# Patient Record
Sex: MALE | Race: WHITE | NOT HISPANIC OR LATINO | URBAN - METROPOLITAN AREA
[De-identification: names, ages, dates, MRNs, and addresses within clinical notes are randomized per-mention and may not be internally consistent; named-entity substitution may affect disease eponyms.]

---

## 2018-02-07 ENCOUNTER — OFFICE VISIT CONVERTED (OUTPATIENT)
Dept: PULMONOLOGY | Facility: CLINIC | Age: 83
End: 2018-02-07
Attending: INTERNAL MEDICINE

## 2021-05-28 VITALS
DIASTOLIC BLOOD PRESSURE: 80 MMHG | OXYGEN SATURATION: 87 % | BODY MASS INDEX: 31.07 KG/M2 | WEIGHT: 217 LBS | HEIGHT: 70 IN | TEMPERATURE: 97.6 F | HEART RATE: 125 BPM | SYSTOLIC BLOOD PRESSURE: 114 MMHG | RESPIRATION RATE: 20 BRPM

## 2021-05-28 NOTE — PROGRESS NOTES
Patient: STU VAZ     Acct: [id number]     Report: #[id number]  UNIT #: [id number]     : 1931    Encounter Date:2018  PRIMARY CARE: ESME AGOSTO  ***Signed***  --------------------------------------------------------------------------------------------------------------------  Chief Complaint      Encounter Date      2018            Referring Provider      Jose F Wilde            Patient Complaint      Patient is complaining of      pt here for 14 day ASHWIN            TRANSITION OF CARE 14D      Transition of Care      ASHWIN 14 Day Followup      Stu presents to office for follow up post discharge from inpatient status     within 14 calendar days. Patient was contacted within 2 business days via phone     conversation. Documentation of that phone call is present in the patient's     electronic chart. He  was admitted to inpatient facility on 18 and was     discharged on 1-3-31 due to:Acute on chronic hypoxic respiratory failure     secondary to acute decompensated diastolic heart failure      Loculated right Pleural effusion chronic      Watery diarrhea      Pulmonary edema      JOSE on Chronic kidney disease stage III improved      COPD with acute exacerbation      Atrial fibrillation on anticoagulation      Deconditioning      Right shoulder weakness with abduction      History of hypertension      History of left lower lobe lobectomy      History of peripheral vascular disease      History of tobacco abuse with cigarettes currently in remission  .            Admitting MD: jose f wilde            His  discharge summary has been reviewed and placed in the patient's electronic     chart.            Problem List      Problem List Reviewed      Patient's problem list has been reviewed from patient discharge summary.            Medications Reviewed      Meds Reviewed      Patient's medication list has been updated/reconciled from discharge summary.            VITALS       Height 5 ft 10 in / 177.8 cm      Weight 217 lbs 0 oz / 98.860634 kg      BSA 2.23 m2      BMI 31.1 kg/m2      Temperature 97.6 F / 36.44 C - Oral      Pulse 125      Respirations 20      Blood Pressure 114/80 Sitting, Right Arm      Pulse Oximetry 87%, nasal cannula, 3.25 lpm            HPI      The patient is an 87 year old male with severe COPD with hypoxic respiratory     failure here for follow up.  History is very limited from the patient because     he is deaf with very profound hearing loss.  According to the family, the     patient is very weak.  He has been very dejected and depressed, sitting around     the house with his lights off. The patient does have some shortness of breath     which is somewhat worse in his baseline, but feels some of this could be     because of his profound weakness. He does have some increased lower extremity     swelling as well despite taking daily Lasix. At this time he has no orthopnea,     chest pains, increased cough or increased sputum production.  Shortness of     breath with all of his ADLs, but is able to get up and walk to the bathroom     with the assistance of a walker.  He has no increased and no increased sputum     production. He does feel that the nebulizer medications are helping with his     symptoms as well as the Daliresp has helped decrease the amount of secretions     that he has.            ROS      Constitutional:  Denies: Fatigue, Fever, Weight gain, Weight loss, Chills,     Insomnia, Other      Respiratory/Breathing:  Complains of: Shortness of air, Denies: Wheezing, Cough    , Hemoptysis, Pleuritic pain, Other      Endocrine:  Denies: Polydipsia, Polyuria, Heat/cold intolerance, Diabetes, Other      Eyes:  Denies: Blurred vision, Vision Changes, Other      Ears, nose, mouth, throat:  Denies: Congestion, Dysphagia, Hearing Changes,     Nose Bleeding, Nasal Discharge, Throat pain, Tinnitus, Other      Cardiovascular:  Denies: Chest Pain, Exertional  dyspnea, Peripheral Edema,     Palpitations, Syncope, Wake up Gasping for air, Orthopnea, Tachycardia, Other      Gastrointestinal:  Denies: Abdominal pain/cramping, Bloody stools, Constipation    , Diarrhea, Melena, Nausea, Vomiting, Other      Genitourinary:  Denies: Dysuria, Urinary frequency, Incontinence, Hematuria,     Urgency, Other      Musculoskeletal:  Denies: Joint Pain, Joint Stiffness, Joint Swelling, Myalgias    , Other      Hematologic/lymphatic:  DENIES: Lymphadenopathy, Bruising, Bleeding tendencies,     Other      Neurologic:  Denies: Headache, Numbness, Weakness, Seizures, Other      Psychiatric:  Denies: Anxiety, Appropriate Effect, Depression, Other      Sleep:  No: Excessive daytime sleep, Morning Headache?, Snoring, Insomnia?,     Stop breathing at sleep?, Other      Integumentary:  Denies: Rash, Dry skin, Skin Warm to Touch, Other            FAMILY/SOCIAL/MEDICAL HX      Surgical History:  Yes: Appendectomy (2005), Bowel Surgery (COLONOSCOPY), CABG (    5 VESSEL 6/06, BIPASS), Carotid Stenosis (2005-RIGHT CAROTID ENDARTERECTOMY),     Eye Surgery (LEOD), Oral Surgery (ESOPHAGEAL DILATION X2), Rectal Surgery (    COLONOSCOPY JAN 2005), Vascular Surgery (LEFT CAROTID ENDARTARECTOMY), Other     Surgeries (THORACENTESIS(MULTIPLE)), No: Abdominal Surgery, Bladder Surgery,     Cholecystectomy, Head Surgery, Orthopedic Surgery      Heart - Family Hx:  Mother, Father      Diabetes - Family Hx:  Mother      Is Father Still Living?:  No      Is Mother Still Living?:  No      Smoking status:  Former smoker (2-3 ppd quit in the 1980s)      Anticoagulation Therapy:  Yes      Antibiotic Prophylaxis:  No      Medical History:  Yes: Arthritis (GENERALIZED), Asthma, Cataracts (SMALL ONE ON     LEFT EYE also one on right eye but not removed), Chronic Bronchitis/COPD,     Emphysema, Congestive Heart Failu, Deafness or Ringing Ears (CURRENTYLY DOES     NOT HAVE THEM), Depression, Gall Stones (GALLSTONES), Heart  "Attack (TEST SHOWED     2 PREVIOUS HEART ATTACKS), Hemorrhoids/Rectal Prob (REFLUX), Hepatitis (AS A     CHILD), High Blood Pressure, Shortness Of Breath, Skin Disease/Psoriais/Ecz,     Miscellaneous Medical/oth (TROUBLE SLEEPING), No: Blood Disease, Chemotherapy/    Cancer, Diabetes, Seizures            Hx Influenza Vaccination:  Yes (FALL 2017)      Date Influenza Vaccine Given:  Oct 1, 2017      Influenza Vaccine Declined:  Yes      2 or More Falls Past Year?:  No      Fall Past Year with Injury?:  No      Hx Pneumococcal Vaccination:  Yes      Chart initiated by      Carolina poole ma            ALLERGIES/MEDICATIONS      Allergies:        Coded Allergies:             PENICILLINS (Verified  Allergy, Severe, swelling,redness,fever, 2/7/18)           ADHESIVE TAPE (Verified  Adverse Reaction, Severe, \"RIPS SKIN OFF\"//    TOLERATES PAPER TAPE., 2/7/18)      Medications    Last Reconciled on 2/7/18 10:46 by HANNAH SINGH MD      LORazepam (Ativan) 0.5 Mg Tablet      0.5 MG PO BID, #60 TAB 0 Refills         Reported         2/7/18       Escitalopram Oxalate (Escitalopram Oxalate*) 5 Mg Tablet      10 MG PO QDAY, TAB         Reported         2/7/18       Furosemide (Furosemide) 20 Mg Tablet      60 MG PO MTWFS, #30 TAB 0 Refills         Reported         1/9/18       Carvedilol (Coreg) 6.25 Mg Tablet      18.75 MG PO BID, #180 TAB 0 Refills         Reported         1/8/18       Cranberry Conc/Ascorbic Acid ([facility] Super Cranberry Softgel) 1 Each Capsule      1 EACH PO QDAY, CAP         Reported         1/8/18       Furosemide* (Lasix*) 40 Mg Tablet      40 MG PO CRISSY LAIRD, #30 TAB 0 Refills         Reported         1/8/18       Apixaban (Eliquis) 5 Mg Tablet      5 MG PO BID for 30 Days, #60 TAB         Prov: Jarek Robison         12/1/17       Escitalopram Oxalate (Escitalopram Oxalate*) 5 Mg Tablet      5 MG PO QDAY for 30 Days, #30 TAB         Prov: Jarek Robison         12/1/17       Roflumilast (Daliresp) 500 Mcg Tab   "    500 MCG PO QDAY for 30 Days, #30 TAB         Prov: Jarek Robison         12/1/17       NEB-Albuterol Sulf (Albuterol Sulfate) 5 Mg/1 Ml Solution      2.5 MG INH RTQID, #3 BOTTLE 0 Refills         Reported         11/27/17       Nitroglycerin (Nitrostat*) 0.4 Mg Tablet      0.4 MG SL ASDIR, #25 TAB 1 Refill         Reported         11/27/17       Montelukast Sodium (Singulair*) 10 Mg Tab      10 MG PO HS, #30 TAB 0 Refills         Reported         9/19/17       Desonide (Desonide 0.05% Lotion) 59 Ml Lotion      1 APL TOPICAL QID Y for DRY SKIN, #1 BOTTLE         Reported         9/14/17       Sennosides (Senexon*) 8.6 Mg Tab      8.6 MG PO QDAY Y for CONSTIPATION, TAB         Reported         9/7/17       Acetaminophen* (Tylenol Extra Strength*) 500 Mg Tablet      1000 MG PO Q Y for PAIN OR FEVER, #100 TAB 0 Refills         Reported         9/7/17       Pravastatin Sod (Pravastatin*) 20 Mg Tablet      20 MG PO HS, #30 TAB 0 Refills         Reported         3/8/17       Ipratropium Bromide Neb (Atrovent 0.02%) 0.5 Mg/2.5 Ml Nebu      0.5 MG INH RTQID, #120 NEB         Reported         3/8/17       Arformoterol Tartrate (Brovana) 15 Mcg/2 Ml Vial.neb      15 MCG INH RTBID, #60 NEB         Reported         7/6/15       Neb-Budesonide (Budesonide) 0.5 Mg/2ML Vial.neb      0.5 MG INH RTBID, #60 NEB         Reported         7/6/15       Aspirin EC (Aspirin EC*) 81 Mg Tabec      81 MG PO Q2D, #30 TAB.EC 0 Refills         Reported         7/6/15       Lansoprazole (Prevacid*) 30 Mg Capsule.dr      30 MG PO QDAY, CAP         Reported         7/6/15      Current Medications      Current Medications Reviewed 2/7/18            EXAM      General: Alert and oriented, no acute distress on supplemental O2, well     developed, (well) nourished.      Head: normocephalic and atraumatic.      EENT: PERRL, no scleral icterus, EOMI, no frontal or maxillary sinus tenderness    , both nares patent without exudate. Moist mucus membranes.  Edentulous, no     pharyngeal erythema or exudate. Modified Mallampati classification (3)      Neck: no masses, no tracheal deviation, no thyromegaly. no stridor.      Lymphatic: no cervical, supraclavicular or axillary lymphadenopathy.       Heart: RRR. No m/r/g. Normal S1 and S2. No JVD. No edema. No carotid bruits.     PMI non displaced or sustained.       Lungs: chest symmetric with respirations. + Bilateral wheezes, no rales +     scattered rhonchi noted. Normal percussion bilaterally. No increased work of     breathing       Chest: + increased AP diameter, normal work of breathing, no masses felt      Abdomen: soft, NDNT, without masses. BS noted in all 4 quadrants. No guarding,     rebound tenderness or hepatosplenomegaly.       Musculoskeletal: No muscle atrophy or weakness. Normal range of motion. No     joint swelling or tenderness.      Extremities: pulses equal in bilateral upper and lower extremities, no clubbing    , cyanosis or edema noted.       Integumentary: intact warm, no rashes, lesions or erythema noted.       Neurologic: Cranial nerves II through XII grossly intact, normal sensation.     Normal gait. Normal speech. No focal deficit.      Vitals      Vitals:             Height 5 ft 10 in / 177.8 cm           Weight 217 lbs 0 oz / 98.874863 kg           BSA 2.23 m2           BMI 31.1 kg/m2           Temperature 97.6 F / 36.44 C - Oral           Pulse 125           Respirations 20           Blood Pressure 114/80 Sitting, Right Arm           Pulse Oximetry 87%, nasal cannula, 3.25 lpm            REVIEW      Results Reviewed      [facility] Results Reviewed?:  Yes Prev Lab Results, Yes Prev Radiology Results, Yes     [facility]            PLAN      Assessment      Notes      New Medications      * ESCITALOPRAM OXALATE (Escitalopram Oxalate*) 5 MG TABLET: 10 MG PO QDAY      * LORazepam (Ativan) 0.5 MG TABLET: 0.5 MG PO BID #60      * SPIRONOLACTONE (Aldactone) 25 MG TABLET: 25 MG PO BID #60       * ARFORMOTEROL TARTRATE (Brovana) 15 MCG/2 ML VIAL.NEB: 15 MCG INH Q12HR #60       Instructions: Submit to Medicare B if applicable. Call for Diagnosis if needed.      * Neb-Budesonide (Pulmicort) 0.5 MG/2 ML AMPUL.NEB: 0.5 MG INH Q12HR #60       Instructions: Submit to Medicare B if applicable. Call for Diagnosis if needed.      * NEB-Albuterol Sulf (Albuterol) 2.5 MG/3 ML VIAL.NEB: 2.5 MG INH Q4H PRN     SHORTNESS OF BREATH #120       Instructions: Submit to Medicare B if applicable. Call for Diagnosis if needed.      * Ipratropium Bromide Neb (Atrovent 0.02%) 0.5 MG/2.5 ML NEBU: 0.5 MG INH Q6H     PRN SHORTNESS OF BREATH #120       Instructions: DIAGNOSIS CODE REQUIRED PRIOR TO PRESCRIBING.      * Montelukast Sodium (Singulair*) 10 MG TAB: 10 MG PO HS #30      * Lactobacillus Acidophilus (Florajen) 460 MG CAPSULE: 460 MG PO BID #60       Instructions: 1 CAP      ASSESSMENT/PLAN:       1. Chronic hypoxic respiratory failure.  Continue oxygen at current flow rate.      2.  COPD with centrilobular emphysema.  Continue budesonide, albuterol,     Atrovent and Brovana.      3. Allergic rhinitis. Continue Singulair.        4. GI upset.  We will give patient Florajen.        5. Cor pulmonale. We will continue Lasix and start Aldactone 25 mg twice daily.      6.  I have personally reviewed laboratory data, imaging as well as previous     medical records.            Patient Education      Education resources provided:  Yes      Patient Education Provided:  COPD                 Disclaimer: Converted document may not contain table formatting or lab diagrams. Please see [facility] for the authenticated document.

## 2022-12-09 ENCOUNTER — TRANSCRIBE ORDERS (OUTPATIENT)
Dept: LAB | Facility: HOSPITAL | Age: 87
End: 2022-12-09

## 2022-12-09 DIAGNOSIS — R18.8 PANCREATIC ASCITES: Primary | ICD-10-CM

## 2022-12-09 DIAGNOSIS — K74.60 HEPATIC CIRRHOSIS, UNSPECIFIED HEPATIC CIRRHOSIS TYPE, UNSPECIFIED WHETHER ASCITES PRESENT: ICD-10-CM
